# Patient Record
Sex: MALE | Race: WHITE | Employment: STUDENT | URBAN - METROPOLITAN AREA
[De-identification: names, ages, dates, MRNs, and addresses within clinical notes are randomized per-mention and may not be internally consistent; named-entity substitution may affect disease eponyms.]

---
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
## 2025-01-31 ENCOUNTER — LAB ENCOUNTER (OUTPATIENT)
Dept: LAB | Facility: HOSPITAL | Age: 20
End: 2025-01-31
Attending: UROLOGY
Payer: COMMERCIAL

## 2025-01-31 ENCOUNTER — HOSPITAL ENCOUNTER (OUTPATIENT)
Dept: ULTRASOUND IMAGING | Facility: HOSPITAL | Age: 20
Discharge: HOME | End: 2025-01-31
Attending: UROLOGY
Payer: COMMERCIAL

## 2025-01-31 DIAGNOSIS — N50.812 PAIN IN BOTH TESTICLES: ICD-10-CM

## 2025-01-31 DIAGNOSIS — N50.811 PAIN IN BOTH TESTICLES: ICD-10-CM

## 2025-01-31 LAB — TESTOST SERPL-MCNC: 229.39 NG/DL

## 2025-01-31 PROCEDURE — 36415 COLL VENOUS BLD VENIPUNCTURE: CPT

## 2025-01-31 PROCEDURE — 84403 ASSAY OF TOTAL TESTOSTERONE: CPT

## 2025-01-31 PROCEDURE — 93975 VASCULAR STUDY: CPT | Performed by: UROLOGY

## 2025-01-31 PROCEDURE — 76870 US EXAM SCROTUM: CPT | Performed by: UROLOGY

## 2025-04-08 ENCOUNTER — TELEPHONE (OUTPATIENT)
Age: 20
End: 2025-04-08

## 2025-04-08 NOTE — TELEPHONE ENCOUNTER
The [facility] Navigation team has attempted to reach you in order to follow up on an order that was placed by Dr. Jean's office. Please give us a call back at [phone] to discuss care coordination and resources.

## 2025-04-29 ENCOUNTER — TELEPHONE (OUTPATIENT)
Age: 20
End: 2025-04-29

## 2025-04-29 NOTE — TELEPHONE ENCOUNTER
Hello - I am reaching out from the [facility] Navigation department, following up on an order from your provider's office to assist in connecting you with resources for care. If you would like to discuss this further, please give us a call at [phone], or for more immediate assistance you can contact our 24-hour help line at [phone]. We look forward to hearing from you soon.

## (undated) NOTE — ED AVS SNAPSHOT
Parent/Legal Guardian Access to the Online [employer] Record of a Patient 15to 16Years Old  Return completed form by Secure email to [facility] Department: [email or web address]     Requirements and Procedures   Under [facility] MyChart ID and password with another person, that person may be able to view my or my child’s health information, and health information about someone who has authorized me as a MyChart proxy.    ·  I agree that it is my responsibility to select a confident Sign-Up Form and I agree to its terms.        Authorization Form     Please enter Patient’s information below:   Name (last, first, middle initial) __________________________________________   Gender  Male  Female    Last 4 Digits of Social Security Number Parent/Legal Guardian Signature                                  For Patient (1517 years of age)  I agree to allow my parent/legal guardian, named above, online access to my medical information currently available and that may become available as a result